# Patient Record
Sex: MALE | Race: WHITE | URBAN - METROPOLITAN AREA
[De-identification: names, ages, dates, MRNs, and addresses within clinical notes are randomized per-mention and may not be internally consistent; named-entity substitution may affect disease eponyms.]

---

## 2017-03-19 ENCOUNTER — OFFICE VISIT (OUTPATIENT)
Dept: URGENT CARE | Facility: URGENT CARE | Age: 62
End: 2017-03-19
Payer: COMMERCIAL

## 2017-03-19 VITALS
SYSTOLIC BLOOD PRESSURE: 137 MMHG | DIASTOLIC BLOOD PRESSURE: 92 MMHG | BODY MASS INDEX: 24.64 KG/M2 | HEART RATE: 82 BPM | TEMPERATURE: 96.5 F | WEIGHT: 157 LBS | OXYGEN SATURATION: 98 % | HEIGHT: 67 IN

## 2017-03-19 DIAGNOSIS — R05.9 COUGH: Primary | ICD-10-CM

## 2017-03-19 PROCEDURE — 99212 OFFICE O/P EST SF 10 MIN: CPT | Performed by: INTERNAL MEDICINE

## 2017-03-19 RX ORDER — AZITHROMYCIN 250 MG/1
TABLET, FILM COATED ORAL
Qty: 6 TABLET | Refills: 0 | Status: SHIPPED | OUTPATIENT
Start: 2017-03-19

## 2017-03-19 NOTE — MR AVS SNAPSHOT
"              After Visit Summary   3/19/2017    Solange Martin    MRN: [id number]           Patient Information     Date Of Birth          1955        Visit Information        Provider Department      3/19/2017 1:00 PM Andrez Solomon MD [facility]        Today's Diagnoses     Cough    -  1       Follow-ups after your visit        Who to contact     If you have questions or need follow up information about today's clinic visit or your schedule please contact [facility] directly at [phone].  Normal or non-critical lab and imaging results will be communicated to you by Boomeranghart, letter or phone within 4 business days after the clinic has received the results. If you do not hear from us within 7 days, please contact the clinic through [employer] or phone. If you have a critical or abnormal lab result, we will notify you by phone as soon as possible.  Submit refill requests through Chief Trunk or call your pharmacy and they will forward the refill request to us. Please allow 3 business days for your refill to be completed.          Additional Information About Your Visit        MyChart Information     Chief Trunk lets you send messages to your doctor, view your test results, renew your prescriptions, schedule appointments and more. To sign up, go to [email or web address] . Click on \"Log in\" on the left side of the screen, which will take you to the Welcome page. Then click on \"Sign up Now\" on the right side of the page.     You will be asked to enter the access code listed below, as well as some personal information. Please follow the directions to create your username and password.     Your access code is: [email or web address]  Expires: 2017  3:38 PM     Your access code will  in 90 days. If you need help or a new code, please call your [place] clinic or [phone].        Care EveryWhere ID     This is your Care EveryWhere ID. This could be used by other " "organizations to access your [place] medical records  [id number]        Your Vitals Were     Pulse Temperature Height Pulse Oximetry BMI (Body Mass Index)       82 96.5  F (35.8  C) (Tympanic) 5' 7\" (1.702 m) 98% 24.59 kg/m2        Blood Pressure from Last 3 Encounters:   03/19/17 (!) 137/92    Weight from Last 3 Encounters:   03/19/17 157 lb (71.2 kg)              Today, you had the following     No orders found for display         Today's Medication Changes          These changes are accurate as of: 3/19/17  3:38 PM.  If you have any questions, ask your nurse or doctor.               Start taking these medicines.        Dose/Directions    azithromycin 250 MG tablet   Commonly known as:  ZITHROMAX   Used for:  Cough   Started by:  Andrez Solomon MD        Two tablets first day, then one tablet daily for four days.   Quantity:  6 tablet   Refills:  0            Where to get your medicines      These medications were sent to [id number] [place] [id number] [place] [id number] [facility] [address]     Phone:  [phone]     azithromycin 250 MG tablet                Primary Care Provider    None       No address on file        Thank you!     Thank you for choosing [facility]  for your care. Our goal is always to provide you with excellent care. Hearing back from our patients is one way we can continue to improve our services. Please take a few minutes to complete the written survey that you may receive in the mail after your visit with us. Thank you!             Your Updated Medication List - Protect others around you: Learn how to safely use, store and throw away your medicines at www.disposemymeds.org.          This list is accurate as of: 3/19/17  3:38 PM.  Always use your most recent med list.                   Brand Name Dispense Instructions for use    azithromycin 250 MG tablet    ZITHROMAX    6 tablet    Two tablets first day, then one tablet daily for " four days.

## 2017-03-19 NOTE — PROGRESS NOTES
"[facility] Progress Note        Andrez Solomon MD, MPH  03/19/2017        History:      Solange Martin is a pleasant 61 year old year old male with a chief complaint of a non-productive cough x 10 days.  No fever or chills.   No dyspnea or chest pain.   He smokes cigs \" occasionally\"   No headache or neck pain.  No GI or  symptoms.   No MSK symptoms.         Assessment and Plan:        Acute bronchitis:  - azithromycin (ZITHROMAX) 250 MG tablet; Two tablets first day, then one tablet daily for four days.  Dispense: 6 tablet; Refill: 0  Discussed supportive care with the patient  Advised to increase fluid intake and rest.  Advised to stop smoking.  F/u w PCP in 1 week, earlier if symptoms worsen.                   Physical Exam:      BP (!) 137/92 (BP Location: Right arm, Patient Position: Chair, Cuff Size: Adult Regular)  Pulse 82  Temp 96.5  F (35.8  C) (Tympanic)  Ht 5' 7\" (1.702 m)  Wt 157 lb (71.2 kg)  SpO2 98%  BMI 24.59 kg/m2     Constitutional: Patient is in no distress The patient is pleasant and cooperative.   HEENT: Head:  Head is atraumatic, normocephalic.    Eyes: Pupils are equal, round and reactive to light and accomodation.  Sclera is non-icteric. No conjunctival injection, or exudate noted. Extraocular motion is intact. Visual acuity is intact bilaterally.  Ears:  External acoustic canals are patent and clear.  There is no erythema and bulging( exudate)  of the ( R/L ) tympanic membrane(s ).   Nose:  No Nasal congestion w/o drainage or mucosal ulceration is noted.  Throat:  Oral mucosa is moist.  No oral lesions are noted.  No posterior pharyngeal hyperemia nor exudate noted.     Neck Supple.  There is no cervical lymphadenopathy.  No nuchal rigidity noted.  There is no meningismus.     Cardiovascular: Heart is regular to rate and rhythm.  No murmur is noted.     Lungs: Clear in the anterior and posterior pulmonary fields.   Abdomen: Soft and non-tender.    Back No " flank tenderness is noted.   Extremeties No edema, no calf tenderness.   Neuro: No focal deficit.   Skin No petechiae or purpura is noted.  There is no rash.   Mood Normal              Data:      All new lab and imaging data was reviewed.   No results found for this or any previous visit.

## 2017-03-19 NOTE — NURSING NOTE
"Chief Complaint   Patient presents with     Urgent Care     Pt in clinic c/o cough for 8 days.     Cough       Initial BP (!) 137/92 (BP Location: Right arm, Patient Position: Chair, Cuff Size: Adult Regular)  Pulse 82  Temp 96.5  F (35.8  C) (Tympanic)  Ht 5' 7\" (1.702 m)  Wt 157 lb (71.2 kg)  SpO2 98%  BMI 24.59 kg/m2 Estimated body mass index is 24.59 kg/(m^2) as calculated from the following:    Height as of this encounter: 5' 7\" (1.702 m).    Weight as of this encounter: 157 lb (71.2 kg).  Medication Reconciliation: complete   Tamra Parikh/ MA    "

## 2017-09-28 ENCOUNTER — THERAPY VISIT (OUTPATIENT)
Dept: PHYSICAL THERAPY | Facility: CLINIC | Age: 62
End: 2017-09-28
Payer: COMMERCIAL

## 2017-09-28 DIAGNOSIS — R26.9 ABNORMAL GAIT: ICD-10-CM

## 2017-09-28 DIAGNOSIS — Z96.649 S/P TOTAL HIP ARTHROPLASTY: ICD-10-CM

## 2017-09-28 DIAGNOSIS — M25.551 HIP PAIN, RIGHT: Primary | ICD-10-CM

## 2017-09-28 PROCEDURE — 97110 THERAPEUTIC EXERCISES: CPT | Mod: GP | Performed by: PHYSICAL THERAPIST

## 2017-09-28 PROCEDURE — 97116 GAIT TRAINING THERAPY: CPT | Mod: GP | Performed by: PHYSICAL THERAPIST

## 2017-09-28 PROCEDURE — 97161 PT EVAL LOW COMPLEX 20 MIN: CPT | Mod: GP | Performed by: PHYSICAL THERAPIST

## 2017-09-28 NOTE — PROGRESS NOTES
Subjective:  Physical Therapy Initial Evaluation   9/28/17  Royer Thompson MD Precautions/Restrictions/MD instructions: s/p R KIMI, anterior, E and T   Therapist Impression:   Pt is a 60 y/o male, 2 days s/p R KIMI. Pt presents with pain, gait abnormalities, weakness, reduced ROM, and postural deficits, along with post op precautions consistent with post op status. These impairments limit their ability to ambulate, navigate stairs, perform ADL's, and exercise. Skilled PT services necessary in order to reduce impairments and improve independent function.    Subjective:   Chief Complaint: Spent 1 day in hospital after surgery. Notes 5 years of chronic hip pain which worsened over the past couple of years. Notes that his knee has been sore for a while now as well. Multiple sets of stairs at home with railings.   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, mission support Job duties: prolonged sitting/standing, driving, lifting   Current HEP/exercise regimen: walking, yard work Patient's goals: return to work and yard work  Medications: pain, mm relaxants  General health as reported by patient: good  Return to MD: 2 week appt  HPI                 Objective:  Hip Examination  Physical Mobility Status:  Gait: using SEC in L hand, antalgic gait offloading R LE  Transfers: utilizes UE's for sit to/from stand    Integument: no signs of infection    Hip PROM:    Flex Ext  Abd  IR  ER    Right 60 pain neutral  30 pain  NT NT     Gluteal Muscle Activation:   Right: good   Left: good      Transversus Abdominus Activation: good    R QS: requires cuing to avoid HS cocontraction    System    Physical Exam    General     ROS    Assessment/Plan:      Patient is a 61 year old male with right side hip complaints.    Patient has the following significant findings with corresponding treatment plan.                Diagnosis 1:  S/p R KIMI, anterior  Pain -  hot/cold therapy, manual therapy, education and home program  Decreased ROM/flexibility - manual therapy and therapeutic exercise  Decreased joint mobility - manual therapy and therapeutic exercise  Decreased strength - therapeutic exercise and therapeutic activities  Impaired balance - neuro re-education and therapeutic activities  Decreased proprioception - neuro re-education and therapeutic activities  Impaired gait - gait training  Impaired muscle performance - neuro re-education  Decreased function - therapeutic activities  Impaired posture - neuro re-education    Therapy Evaluation Codes:   1) History comprised of:   Personal factors that impact the plan of care:      Past/current experiences and Time since onset of symptoms.    Comorbidity factors that impact the plan of care are:      Smoking and Weakness.     Medications impacting care: Muscle relaxant and Pain.  2) Examination of Body Systems comprised of:   Body structures and functions that impact the plan of care:      Hip and Lumbar spine.   Activity limitations that impact the plan of care are:      Bathing, Driving, Dressing, Lifting, Squatting/kneeling, Stairs, Standing, Walking, Working and Sleeping.  3) Clinical presentation characteristics are:   Stable/Uncomplicated.  4) Decision-Making    Moderate complexity using standardized patient assessment instrument and/or measureable assessment of functional outcome.  Cumulative Therapy Evaluation is: Low complexity.    Previous and current functional limitations:  (See Goal Flow Sheet for this information)    Short term and Long term goals:  (See Goal Flow Sheet for this information)     Communication ability:  Patient appears to be able to clearly communicate and understand verbal and written communication and follow directions correctly.  Treatment Explanation - The following has been discussed with the patient:   RX ordered/plan of care  Anticipated outcomes  Possible risks and side effects  This patient would benefit from PT intervention to resume normal activities.   Rehab potential is excellent.    Frequency:  1-2 X week, once daily  Duration:  for 8 weeks  Discharge Plan:  Achieve all LTG.  Independent in home treatment program.  Reach maximal therapeutic benefit.    Please refer to the daily flowsheet for treatment today, total treatment time and time spent performing 1:1 timed codes.

## 2017-09-28 NOTE — LETTER
[facility] PH[facility] THERAPY  [address] [facility] [address] [facility] [address]  [phone]    2017    Re: Solange Martin   :   1955  MRN:  [id number]   REFERRING PHYSICIAN:   Royer Thompson    [facility]    Date of Initial Evaluation:  2017  Visits:  1  Rxs Used: 1  Reason for Referral:     Hip pain, right  Abnormal gait  S/P total hip arthroplasty    Physical Therapy Initial Evaluation   17  Royer Thompson MD Precautions/Restrictions/MD instructions: s/p R KIMI, anterior, E and T   Therapist Impression:   Pt is a 62 y/o male, 2 days s/p R KIMI. Pt presents with pain, gait abnormalities, weakness, reduced ROM, and postural deficits, along with post op precautions consistent with post op status. These impairments limit their ability to ambulate, navigate stairs, perform ADL's, and exercise. Skilled PT services necessary in order to reduce impairments and improve independent function.    Subjective:   Chief Complaint: Spent 1 day in hospital after surgery. Notes 5 years of chronic hip pain which worsened over the past couple of years. Notes that his knee has been sore for a while now as well. Multiple sets of stairs at home with railings.   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, mission support Job duties: prolonged sitting/standing, driving, lifting   Current HEP/exercise regimen: walking, yard work  Patient's goals: return to work and yard work  Medications: pain, mm relaxants  General health as reported by patient: good  Return to MD: 2 week appt  HPI                 Objective:  Hip Examination  Physical Mobility Status:  Gait: using SEC in L hand, antalgic gait offloading R LE  Transfers: utilizes UE's for sit to/from stand  Integument: no signs of infection  Hip PROM:    Flex Ext  Abd  IR  ER    Right 60 pain neutral  30 pain  NT NT     Gluteal Muscle Activation:   Right: good   Left: goo  Transversus Abdominus Activation: good  R QS: requires cuing to avoid HS cocontraction    Assessment/Plan:    Patient is a 61 year old male with right side hip complaints.    Patient has the following significant findings with corresponding treatment plan.                Diagnosis 1:  S/p R KIMI, anterior  Pain -  hot/cold therapy, manual therapy, education and home program  Decreased ROM/flexibility - manual therapy and therapeutic exercise  Decreased joint mobility - manual therapy and therapeutic exercise  Decreased strength - therapeutic exercise and therapeutic activities  Impaired balance - neuro re-education and therapeutic activities  Re: Solange Martin   :   1955    Decreased proprioception - neuro re-education and therapeutic activities  Impaired gait - gait training  Impaired muscle performance - neuro re-education  Decreased function - therapeutic activities  Impaired posture - neuro re-education  Therapy Evaluation Codes:   1) History comprised of:   Personal factors that impact the plan of care:      Past/current experiences and Time since onset of symptoms.    Comorbidity factors that impact the plan of care are:      Smoking and Weakness.     Medications impacting care: Muscle relaxant and Pain.  2) Examination of Body Systems comprised of:   Body structures and functions that impact the plan of care:      Hip and Lumbar spine.   Activity limitations that impact the plan of care are:       Bathing, Driving, Dressing, Lifting, Squatting/kneeling, Stairs, Standing, Walking, Working and Sleeping.  3) Clinical presentation characteristics are:   Stable/Uncomplicated.  4) Decision-Making    Moderate complexity using standardized patient assessment instrument and/or measureable assessment of functional outcome.  Cumulative Therapy Evaluation is: Low complexity.  Previous and current functional limitations:  (See Goal Flow Sheet for this information)    Short term and Long term goals: (See Goal Flow Sheet for this information)   Communication ability:  Patient appears to be able to clearly communicate and understand verbal and written communication and follow directions correctly.  Treatment Explanation - The following has been discussed with the patient:   RX ordered/plan of care  Anticipated outcomes  Possible risks and side effects  This patient would benefit from PT intervention to resume normal activities.   Rehab potential is excellent.  Frequency:  1-2 X week, once daily  Duration:  for 8 weeks  Discharge Plan:  Achieve all LTG.  Independent in home treatment program.  Reach maximal therapeutic benefit.          Thank you for your referral.    INQUIRIES  Therapist: Atif Willingham, PT  [facility]  [address] [place] [address]  Phone: [phone]  Fax: [phone]

## 2017-09-28 NOTE — MR AVS SNAPSHOT
After Visit Summary   9/28/2017    Solange Martin    MRN: [id number]           Patient Information     Date Of Birth          1955        Visit Information        Provider Department      9/28/2017 1:20 PM Atif Willingham PT [facility]        Today's Diagnoses     Hip pain, right    -  1    Abnormal gait        S/P total hip arthroplasty           Follow-ups after your visit        Your next 10 appointments already scheduled     Oct 04, 2017  1:40 PM CDT   ANNABELLE Extremity with Atif Willingham PT   [facility] ([facility]  )    [address] [place] [address]   [phone]            Oct 11, 2017  9:00 AM CDT   ANNABELLE Extremity with Atif Willingham PT   [facility] ([facility]  )    [address] [place] [address]   [phone]              Who to contact     If you have questions or need follow up information about today's clinic visit or your schedule please contact [facility] directly at [phone].  Normal or non-critical lab and imaging results will be communicated to you by MyChart, letter or phone within 4 business days after the clinic has received the results. If you do not hear from us within 7 days, please contact the clinic through Y[employer] Eateryhart or phone. If you have a critical or abnormal lab result, we will notify you by phone as soon as possible.  Submit refill requests through [email or web address] or call your pharmacy and they will forward the refill request to us. Please allow 3 business days for your refill to be completed.          Additional Information About Your Visit        Yummy [employer] Kids Eateryhart Information     [email or web address] lets you send messages to your doctor, view your test results, renew your prescriptions, schedule appointments and more. To sign up, go to  [email or web address] . Click on \"Log in\" on the left side of the screen, which will take you to the Welcome page. Then click on \"Sign up Now\" on the right side of the page.     You will be asked to enter the access code listed below, as well as some personal information. Please follow the directions to create your username and password.     Your access code is: [email or web address]  Expires: 2017  2:39 PM     Your access code will  in 90 days. If you need help or a new code, please call your [place] clinic or [phone].        Care EveryWhere ID     This is your Care EveryWhere ID. This could be used by other organizations to access your [place] medical records  [id number]         Blood Pressure from Last 3 Encounters:   17 (!) 137/92    Weight from Last 3 Encounters:   17 71.2 kg (157 lb)              We Performed the Following     GAIT TRAINING THERAPY     HC PT EVAL, LOW COMPLEXITY     ANNABELLE INITIAL EVAL REPORT     THERAPEUTIC EXERCISES        Primary Care Provider    None Specified       No primary provider on file.        Equal Access to Services     [facility]: Hadii bairon Caro, renetta castelan, almaz young, kalpana abdul . So [facility] [phone].    ATENCIÓN: Si habla español, tiene a walters disposición servicios gratuitos de asistencia lingüística. Whitney al [phone].    We comply with applicable federal civil rights laws and [place] laws. We do not discriminate on the basis of race, color, national origin, age, disability sex, sexual orientation or gender identity.            Thank you!     Thank you for choosing [facility]  for your care. Our goal is always to provide you with excellent care. Hearing back from our patients is one way we can continue to improve our services. Please take a few minutes to complete the written survey that you may receive in the mail after your " visit with us. Thank you!             Your Updated Medication List - Protect others around you: Learn how to safely use, store and throw away your medicines at www.disposemymeds.org.          This list is accurate as of: 9/28/17  2:39 PM.  Always use your most recent med list.                   Brand Name Dispense Instructions for use Diagnosis    azithromycin 250 MG tablet    ZITHROMAX    6 tablet    Two tablets first day, then one tablet daily for four days.    Cough

## 2017-09-29 ASSESSMENT — ACTIVITIES OF DAILY LIVING (ADL)
GOING_DOWN_1_FLIGHT_OF_STAIRS: UNABLE TO DO
HOS_ADL_SCORE(%): 41.18
STANDING_FOR_15_MINUTES: EXTREME DIFFICULTY
RECREATIONAL_ACTIVITIES: NO DIFFICULTY AT ALL
HOS_ADL_ITEM_SCORE_TOTAL: 28
TWISTING/PIVOTING_ON_INVOLVED_LEG: MODERATE DIFFICULTY
HOS_ADL_COUNT: 17
HOS_ADL_HIGHEST_POTENTIAL_SCORE: 68
WALKING_DOWN_STEEP_HILLS: NO DIFFICULTY AT ALL
GETTING_INTO_AND_OUT_OF_A_BATHTUB: UNABLE TO DO
STEPPING_UP_AND_DOWN_CURBS: UNABLE TO DO
HEAVY_WORK: NO DIFFICULTY AT ALL
HOW_WOULD_YOU_RATE_YOUR_CURRENT_LEVEL_OF_FUNCTION_DURING_YOUR_USUAL_ACTIVITIES_OF_DAILY_LIVING_FROM_0_TO_100_WITH_100_BEING_YOUR_LEVEL_OF_FUNCTION_PRIOR_TO_YOUR_HIP_PROBLEM_AND_0_BEING_THE_INABILITY_TO_PERFORM_ANY_OF_YOUR_USUAL_DAILY_ACTIVITIES?: 25
ROLLING_OVER_IN_BED: MODERATE DIFFICULTY
WALKING_UP_STEEP_HILLS: NO DIFFICULTY AT ALL
SITTING_FOR_15_MINUTES: UNABLE TO DO
DEEP_SQUATTING: NO DIFFICULTY AT ALL
WALKING_15_MINUTES_OR_GREATER: UNABLE TO DO
GETTING_INTO_AND_OUT_OF_AN_AVERAGE_CAR: MODERATE DIFFICULTY
PUTTING_ON_SOCKS_AND_SHOES: SLIGHT DIFFICULTY
WALKING_INITIALLY: EXTREME DIFFICULTY
GOING_UP_1_FLIGHT_OF_STAIRS: UNABLE TO DO
WALKING_APPROXIMATELY_10_MINUTES: UNABLE TO DO
LIGHT_TO_MODERATE_WORK: UNABLE TO DO

## 2017-10-04 ENCOUNTER — THERAPY VISIT (OUTPATIENT)
Dept: PHYSICAL THERAPY | Facility: CLINIC | Age: 62
End: 2017-10-04
Payer: COMMERCIAL

## 2017-10-04 DIAGNOSIS — Z96.649 S/P TOTAL HIP ARTHROPLASTY: ICD-10-CM

## 2017-10-04 DIAGNOSIS — M25.551 HIP PAIN, RIGHT: ICD-10-CM

## 2017-10-04 DIAGNOSIS — R26.9 ABNORMAL GAIT: ICD-10-CM

## 2017-10-04 PROCEDURE — 97110 THERAPEUTIC EXERCISES: CPT | Mod: GP | Performed by: PHYSICAL THERAPIST
